# Patient Record
Sex: FEMALE | Race: WHITE | NOT HISPANIC OR LATINO | URBAN - METROPOLITAN AREA
[De-identification: names, ages, dates, MRNs, and addresses within clinical notes are randomized per-mention and may not be internally consistent; named-entity substitution may affect disease eponyms.]

---

## 2021-03-04 ENCOUNTER — IMPORTED ENCOUNTER (OUTPATIENT)
Dept: URBAN - METROPOLITAN AREA CLINIC 9 | Facility: CLINIC | Age: 36
End: 2021-03-04

## 2021-04-06 ENCOUNTER — IMPORTED ENCOUNTER (OUTPATIENT)
Dept: URBAN - METROPOLITAN AREA CLINIC 9 | Facility: CLINIC | Age: 36
End: 2021-04-06

## 2021-05-06 ENCOUNTER — IMPORTED ENCOUNTER (OUTPATIENT)
Dept: URBAN - METROPOLITAN AREA CLINIC 9 | Facility: CLINIC | Age: 36
End: 2021-05-06

## 2021-05-27 ENCOUNTER — IMPORTED ENCOUNTER (OUTPATIENT)
Dept: URBAN - METROPOLITAN AREA CLINIC 9 | Facility: CLINIC | Age: 36
End: 2021-05-27

## 2021-05-27 PROBLEM — D23.121: Noted: 2021-05-27

## 2021-10-16 ASSESSMENT — VISUAL ACUITY
OS_SC: CF 1FT SN
OD_SC: 20/30 -2 SN
OD_CC: 20/20 -2 SN
OS_SC: 20/30 SN
OD_SC: 20/25 -2 SN
OS_CC: 20/25 -2 SN
OS_SC: 20/40 -2 SN

## 2021-10-16 ASSESSMENT — TONOMETRY
OD_IOP_MMHG: 15
OS_IOP_MMHG: 17
OD_IOP_MMHG: 15
OS_IOP_MMHG: 15
OS_IOP_MMHG: 15

## 2021-10-28 ENCOUNTER — POST OP-DILATION (OUTPATIENT)
Dept: URBAN - METROPOLITAN AREA CLINIC 12 | Facility: CLINIC | Age: 36
End: 2021-10-28

## 2021-10-28 DIAGNOSIS — D23.121: ICD-10-CM

## 2021-10-28 DIAGNOSIS — H25.011: ICD-10-CM

## 2021-10-28 DIAGNOSIS — Z96.1: ICD-10-CM

## 2021-10-28 PROCEDURE — 92014 COMPRE OPH EXAM EST PT 1/>: CPT

## 2021-10-28 ASSESSMENT — VISUAL ACUITY
OS_SC: 20/30
OD_SC: 20/30

## 2021-10-28 ASSESSMENT — TONOMETRY
OS_IOP_MMHG: 20
OD_IOP_MMHG: 20

## 2022-01-18 NOTE — PATIENT DISCUSSION
Mild Steroid recommended. Poor insurance coverage with Alrex/Lotemax so went with generic pred 3-4x/day for 1 week then discontinue and only use for flare-ups.

## 2022-02-18 NOTE — PATIENT DISCUSSION
MILD DERMATOCHALASIS BOTH UPPER EYELIDS; RECOMMEND COSMETIC UPPER EYELID BLEPHAROPLASTY, OU. I have examined the patient and reviewed  the photos. The patient understands this is cosmetic. We have discussed the risks and benefits of the surgery at length as well as the location of the incision and the recovery process.  The patient understands this discussion, has had all questions answered and desires to proceed with cosmetic blepharoplasty surgery as explained.

## 2022-06-06 NOTE — PATIENT DISCUSSION
Patient uses a DVO rx and a monovision rx (OD distance).  Moderate revision OD based on 11/2021 refraction and today's tweak to that.  Discussed cutting OS from -3.00 to -2.75 but patient likes OS as is so we left it alone.  Trial wear and ok to order.  Told her we can split the difference and revise OD to -2.25 (has been wearing -2.50 OD).

## 2022-06-25 RX ORDER — CALCIUM POLYCARBOPHIL 625 MG
TABLET ORAL
COMMUNITY

## 2022-06-25 RX ORDER — ASPIRIN 81 MG
TABLET, DELAYED RELEASE (ENTERIC COATED) ORAL
COMMUNITY

## 2022-06-25 RX ORDER — PHOSPHORATED CARBOHYDRATE 1.87; 21.5; 1.87 G/5ML; MG/5ML; G/5ML
SOLUTION ORAL
COMMUNITY

## 2022-06-25 RX ORDER — LORAZEPAM 1 MG/1
TABLET ORAL
COMMUNITY

## 2022-06-25 RX ORDER — FLUVOXAMINE MALEATE 100 MG
TABLET ORAL
COMMUNITY

## 2022-06-25 RX ORDER — FERRIC OXIDE RED, ZINC OXIDE, AND PRAMOXINE HYDROCHLORIDE 1.36; 78.65; 1 MG/ML; MG/ML; MG/ML
LOTION TOPICAL
COMMUNITY

## 2022-06-25 RX ORDER — EZETIMIBE 10 MG/1
TABLET ORAL
COMMUNITY

## 2022-06-25 RX ORDER — SERTRALINE HYDROCHLORIDE 100 MG/1
TABLET, FILM COATED ORAL
COMMUNITY

## 2022-06-25 RX ORDER — QUETIAPINE FUMARATE 50 MG/1
TABLET, FILM COATED ORAL
COMMUNITY

## 2022-06-25 RX ORDER — FLUTICASONE PROPIONATE 50 MCG
SPRAY, SUSPENSION (ML) NASAL
COMMUNITY

## 2022-06-25 RX ORDER — ESTROGEN,CON/M-PROGEST ACET 0.45-1.5MG
TABLET ORAL
COMMUNITY
Start: 2021-12-06

## 2022-06-25 RX ORDER — BETAMETHASONE DIPROPIONATE 0.5 MG/G
CREAM TOPICAL
COMMUNITY

## 2022-06-25 RX ORDER — ASCORBIC ACID 500 MG
TABLET ORAL
COMMUNITY

## 2022-06-25 RX ORDER — BUPROPION HYDROCHLORIDE 150 MG/1
TABLET, EXTENDED RELEASE ORAL
COMMUNITY

## 2022-12-09 ENCOUNTER — ESTABLISHED PATIENT (OUTPATIENT)
Dept: URBAN - METROPOLITAN AREA CLINIC 6 | Facility: CLINIC | Age: 37
End: 2022-12-09

## 2022-12-09 PROCEDURE — 92014 COMPRE OPH EXAM EST PT 1/>: CPT

## 2022-12-09 ASSESSMENT — VISUAL ACUITY
OD_SC: 20/25-1
OU_SC: J1+
OS_SC: 20/30
OU_SC: 20/25

## 2022-12-09 ASSESSMENT — TONOMETRY
OD_IOP_MMHG: 15
OS_IOP_MMHG: 15

## 2023-05-18 ENCOUNTER — ESTABLISHED PATIENT (OUTPATIENT)
Dept: URBAN - METROPOLITAN AREA CLINIC 6 | Facility: CLINIC | Age: 38
End: 2023-05-18

## 2023-05-18 DIAGNOSIS — H25.011: ICD-10-CM

## 2023-05-18 DIAGNOSIS — Z96.1: ICD-10-CM

## 2023-05-18 DIAGNOSIS — D23.121: ICD-10-CM

## 2023-05-18 PROCEDURE — 92012 INTRM OPH EXAM EST PATIENT: CPT

## 2023-05-18 ASSESSMENT — VISUAL ACUITY
OD_SC: 20/25
OS_SC: 20/25+2

## 2023-05-18 ASSESSMENT — TONOMETRY
OD_IOP_MMHG: 15
OS_IOP_MMHG: 15

## 2023-10-12 ENCOUNTER — CLINIC PROCEDURE ONLY (OUTPATIENT)
Dept: URBAN - METROPOLITAN AREA CLINIC 12 | Facility: CLINIC | Age: 38
End: 2023-10-12

## 2023-10-12 DIAGNOSIS — H25.011: ICD-10-CM

## 2023-10-12 DIAGNOSIS — Z96.1: ICD-10-CM

## 2023-10-12 DIAGNOSIS — D23.121: ICD-10-CM

## 2023-10-12 PROCEDURE — 99213 OFFICE O/P EST LOW 20 MIN: CPT

## 2023-10-12 PROCEDURE — 11440 EXC FACE-MM B9+MARG 0.5 CM/<: CPT | Mod: E1

## 2023-10-12 RX ORDER — NEOMYCIN SULFATE, POLYMYXIN B SULFATE AND DEXAMETHASONE 3.5; 10000; 1 MG/G; [USP'U]/G; MG/G: OINTMENT OPHTHALMIC TWICE A DAY

## 2023-10-12 ASSESSMENT — VISUAL ACUITY
OD_SC: 20/25-2
OS_SC: 20/25-2

## 2023-12-08 NOTE — PATIENT DISCUSSION
Continue Artificial Tears prn.
Discussed lid hygiene with Ocusoft QHS OU and warm compresses daily.
Dr. Jameson Mcdonnell evaluates annually.
Glasses and Contacts Rx given.
Healing well.
If symptoms worsen other available treatments can be considered.
Mild Steroid recommended. Poor insurance coverage with Alrex/Lotemax so went with generic pred 3-4x/day for 1 week then discontinue and only use for flare-ups.
Monitor.
Patient given Rx for glasses and contacts.
Patient uses a DVO rx and a monovision rx (OD distance).  Moderate revision OD based on 11/2021 refraction and today's tweak to that.  Discussed cutting OS from -3.00 to -2.75 but patient likes OS as is so we left it alone.  Trial wear and ok to order.  Told her we can split the difference and revise OD to -2.25 (has been wearing -2.50 OD).
Post op instructions reviewed with patients including the use of drops.
RTC if symptoms do not improve.
Recommended artificial tears to use as directed.
Recommended warm compresses.
Sutures removed without difficulty or complications.
Switch to Pataday Extra Strength BID OU.
The cataracts are mild and have minimal impact on vision at this time.
last DFE 1 week ago, Dr. Jonathan Washington. stable to post-op findings.
monitor annually.
Good